# Patient Record
Sex: FEMALE | Race: BLACK OR AFRICAN AMERICAN | Employment: FULL TIME | URBAN - METROPOLITAN AREA
[De-identification: names, ages, dates, MRNs, and addresses within clinical notes are randomized per-mention and may not be internally consistent; named-entity substitution may affect disease eponyms.]

---

## 2022-06-02 ENCOUNTER — OFFICE VISIT (OUTPATIENT)
Age: 32
End: 2022-06-02
Payer: OTHER GOVERNMENT

## 2022-06-02 VITALS
DIASTOLIC BLOOD PRESSURE: 81 MMHG | HEART RATE: 110 BPM | SYSTOLIC BLOOD PRESSURE: 122 MMHG | OXYGEN SATURATION: 98 % | WEIGHT: 269.8 LBS | RESPIRATION RATE: 18 BRPM | TEMPERATURE: 97.7 F | BODY MASS INDEX: 39.96 KG/M2 | HEIGHT: 69 IN

## 2022-06-02 DIAGNOSIS — Z87.42 H/O MENORRHAGIA: ICD-10-CM

## 2022-06-02 DIAGNOSIS — Z34.93 PREGNANT AND NOT YET DELIVERED IN THIRD TRIMESTER: ICD-10-CM

## 2022-06-02 DIAGNOSIS — D50.8 OTHER IRON DEFICIENCY ANEMIA: Primary | ICD-10-CM

## 2022-06-02 PROCEDURE — 99204 OFFICE O/P NEW MOD 45 MIN: CPT | Performed by: INTERNAL MEDICINE

## 2022-06-02 NOTE — PROGRESS NOTES
Hematology/Oncology  Progress Note    Name: Jacoby Negrete  Date: 2022  : 1990  Primary Care Provider: Gennaro Ferguson NP    Ms. Zackery Ocampo is a 34y.o. year old female with BAYRON and pregnant, due . History of heavy periods when not pregnant. This is a girl and second child     is active duty    Current Therapy: diagnostic evaluation    Subjective: The past medical, surgical and social history has been reviewed and remains unchanged. History reviewed. No pertinent past medical history. Past Surgical History:   Procedure Laterality Date    HX ACL RECONSTRUCTION       Social History     Socioeconomic History    Marital status:      Spouse name: Not on file    Number of children: Not on file    Years of education: Not on file    Highest education level: Not on file   Occupational History    Not on file   Tobacco Use    Smoking status: Never Smoker    Smokeless tobacco: Never Used   Vaping Use    Vaping Use: Never used   Substance and Sexual Activity    Alcohol use: Never    Drug use: Never    Sexual activity: Yes     Partners: Male   Other Topics Concern    Not on file   Social History Narrative    Not on file     Social Determinants of Health     Financial Resource Strain:     Difficulty of Paying Living Expenses: Not on file   Food Insecurity:     Worried About Running Out of Food in the Last Year: Not on file    Dee Dee of Food in the Last Year: Not on file   Transportation Needs:     Lack of Transportation (Medical): Not on file    Lack of Transportation (Non-Medical):  Not on file   Physical Activity:     Days of Exercise per Week: Not on file    Minutes of Exercise per Session: Not on file   Stress:     Feeling of Stress : Not on file   Social Connections:     Frequency of Communication with Friends and Family: Not on file    Frequency of Social Gatherings with Friends and Family: Not on file    Attends Jainism Services: Not on file   [address] [facility] [employer] or Organizations: Not on file    Attends Club or Organization Meetings: Not on file    Marital Status: Not on file   Intimate Partner Violence:     Fear of Current or Ex-Partner: Not on file    Emotionally Abused: Not on file    Physically Abused: Not on file    Sexually Abused: Not on file   Housing Stability:     Unable to Pay for Housing in the Last Year: Not on file    Number of Places Lived in the Last Year: Not on file    Unstable Housing in the Last Year: Not on file     Family History   Problem Relation Age of Onset    Hypertension Father     Diabetes Sister          Review of Systems:    General :The patient has no complaints except for being very pregnant    Psychological : patient denies having any psychological symptoms such as hallucinations depression or anxiety. Ophthalmic:the patient denies having any visual impairment or eye discomfort. ENT: there are no abnormalities reported. Allergy and Immunology:the patient denies having any seasonal allergies or allergies to medications other than those already outlined above. Hematological and Lymphatic: the patient denies having any bruising, bleeding or lymphadenopathy. Endocrine: the patient denies having any heat or cold intolerance. There is no history of diabetes or thyroid disorders. Breast: the patient denies having any history of breast mass or lumps. Respiratory:the patient denies having any cough, shortness of breath, or dyspnea on exertion. Cardiovascular: there are no complaints of chest pain, palpitations, chest pounding, or dyspnea on exertion. Gastrointestinal: the patient denies having nausea, emesis, diarrhea, constipation, or blood in the stool. Genito-Urinary: the patient denies having urinary urgency, frequency, or dysuria. Musculoskeletal: with the exception of mild arthralgias the patient has no other musculoskeletal complaints.      Neurological: denies having any numbness, tingling, or neurologic deficits. Dermatological: patient denies having any unexplained rash, skin ulcerations, or hives. Objective:     Visit Vitals  /81   Pulse (!) 110   Temp 97.7 °F (36.5 °C)   Resp 18   Ht 5' 9\" (1.753 m)   Wt 122.4 kg (269 lb 12.8 oz)   LMP  (LMP Unknown)   SpO2 98%   BMI 39.84 kg/m²     Pain Score: 0    Physical Exam:     ECO    General: Well appearing, in NAD and very pregnant    Psychologic: mood and affect are appropriate, no anxiety or depression noted    Skin: examination of the skin reveals no bruising, rash or petechiae    HEENT: Normocephalic, atraumatic. Conjunctiva and sclera are clear. Pupils are equal, round and reactive to light. EOMs are intact. Neck: supple without lymphadenopathy, JVD or thyromegaly    Lymphatics: no palpable cervical, supraclavicular, lymphadenopathy    Lungs: clear breath sounds bilaterally, no rhonchi or wheezes noted    Heart: Regular rate and rhythm, no murmurs, rubs or gallops, S1-S2 noted. Abdomen: soft, non-tender, non-distended, no HSM, uterus at costal margins    Extremities: without clubbing, cyanosis or edema    Neurologic: no focal deficits, steady gait, Alert and oriented x 3. Laboratory Data:     No results found for this or any previous visit. There is no problem list on file for this patient. Assessment:     1. Other iron deficiency anemia    2. Pregnant and not yet delivered in third trimester    3. H/O menorrhagia        Plan:     1. The patient is known to have anemia we do not have confirmation that it is an iron deficiency anemia but that is the most likely cause. 2. We should proceed with blood test today. 3. Should iron deficiency anemia be confirmed we shall order Ativan of her twice a week to start as soon as possible. 4. Set up a follow-up visit in 3 months if we do the iron infusions at with laboratory testing telephone visit.   5. Patient is in agreement with this plan.      Follow-up and Dispositions  ·   Return in about 3 months (around 9/2/2022) for Follow up with labs, Follow-up telephone visit. Orders Placed This Encounter    CBC WITH AUTOMATED DIFF     Standing Status:   Future     Standing Expiration Date:   6/3/2023    FERRITIN     Standing Status:   Future     Standing Expiration Date:   1/0/2603    METABOLIC PANEL, COMPREHENSIVE     Standing Status:   Future     Standing Expiration Date:   6/3/2023    IRON PROFILE     Standing Status:   Future     Standing Expiration Date:   6/3/2023    HEMOGLOBIN FRACTIONATION     Standing Status:   Future     Standing Expiration Date:   6/3/2023    CBC WITH AUTOMATED DIFF     Standing Status:   Future     Standing Expiration Date:   6/3/2023    FERRITIN     Standing Status:   Future     Standing Expiration Date:   0/8/8298    METABOLIC PANEL, COMPREHENSIVE     Standing Status:   Future     Standing Expiration Date:   6/3/2023    IRON PROFILE     Standing Status:   Future     Standing Expiration Date:   6/3/2023    ferrous sulfate (IRON PO)     Sig: Take  by mouth daily.        Freddy Mendes MD  6/2/2022

## 2022-06-21 PROBLEM — Z3A.39 39 WEEKS GESTATION OF PREGNANCY: Status: ACTIVE | Noted: 2022-06-21
